# Patient Record
Sex: FEMALE | Race: WHITE | NOT HISPANIC OR LATINO | Employment: PART TIME | URBAN - METROPOLITAN AREA
[De-identification: names, ages, dates, MRNs, and addresses within clinical notes are randomized per-mention and may not be internally consistent; named-entity substitution may affect disease eponyms.]

---
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
## 2024-02-14 PROCEDURE — 87081 CULTURE SCREEN ONLY: CPT

## 2025-03-17 ENCOUNTER — LAB (OUTPATIENT)
Dept: LAB | Facility: HOSPITAL | Age: 20
End: 2025-03-17
Payer: COMMERCIAL

## 2025-03-17 ENCOUNTER — TELEPHONE (OUTPATIENT)
Dept: FAMILY MEDICINE CLINIC | Facility: CLINIC | Age: 20
End: 2025-03-17

## 2025-03-17 ENCOUNTER — OFFICE VISIT (OUTPATIENT)
Dept: FAMILY MEDICINE CLINIC | Facility: CLINIC | Age: 20
End: 2025-03-17
Payer: COMMERCIAL

## 2025-03-17 VITALS
OXYGEN SATURATION: 99 % | BODY MASS INDEX: 25.4 KG/M2 | RESPIRATION RATE: 20 BRPM | WEIGHT: 138 LBS | DIASTOLIC BLOOD PRESSURE: 72 MMHG | HEIGHT: 62 IN | HEART RATE: 81 BPM | SYSTOLIC BLOOD PRESSURE: 98 MMHG | TEMPERATURE: 98.7 F

## 2025-03-17 DIAGNOSIS — R07.9 CHEST PAIN, UNSPECIFIED TYPE: Primary | ICD-10-CM

## 2025-03-17 DIAGNOSIS — Z00.00 WELLNESS EXAMINATION: ICD-10-CM

## 2025-03-17 DIAGNOSIS — R07.9 CHEST PAIN, UNSPECIFIED TYPE: ICD-10-CM

## 2025-03-17 DIAGNOSIS — N64.4 FOCAL NON-CYCLICAL BREAST PAIN: ICD-10-CM

## 2025-03-17 LAB
25(OH)D3 SERPL-MCNC: 10 NG/ML (ref 30–100)
ALBUMIN SERPL-MCNC: 4.3 G/DL (ref 3.5–5.2)
ALBUMIN/GLOB SERPL: 1.5 G/DL
ALP SERPL-CCNC: 73 U/L (ref 39–117)
ALT SERPL W P-5'-P-CCNC: 23 U/L (ref 1–33)
ANION GAP SERPL CALCULATED.3IONS-SCNC: 9.9 MMOL/L (ref 5–15)
AST SERPL-CCNC: 22 U/L (ref 1–32)
BILIRUB SERPL-MCNC: 0.4 MG/DL (ref 0–1.2)
BUN SERPL-MCNC: 13 MG/DL (ref 6–20)
BUN/CREAT SERPL: 20.3 (ref 7–25)
CALCIUM SPEC-SCNC: 9 MG/DL (ref 8.6–10.5)
CHLORIDE SERPL-SCNC: 104 MMOL/L (ref 98–107)
CHOLEST SERPL-MCNC: 102 MG/DL (ref 0–200)
CO2 SERPL-SCNC: 24.1 MMOL/L (ref 22–29)
CREAT SERPL-MCNC: 0.64 MG/DL (ref 0.57–1)
D DIMER PPP FEU-MCNC: <0.27 MCGFEU/ML (ref 0–0.5)
DEPRECATED RDW RBC AUTO: 37.8 FL (ref 37–54)
EGFRCR SERPLBLD CKD-EPI 2021: 129.1 ML/MIN/1.73
ERYTHROCYTE [DISTWIDTH] IN BLOOD BY AUTOMATED COUNT: 11.6 % (ref 12.3–15.4)
GLOBULIN UR ELPH-MCNC: 2.8 GM/DL
GLUCOSE SERPL-MCNC: 81 MG/DL (ref 65–99)
HBA1C MFR BLD: 5 % (ref 4.8–5.6)
HCG SERPL QL: NEGATIVE
HCT VFR BLD AUTO: 41.8 % (ref 34–46.6)
HDLC SERPL-MCNC: 35 MG/DL (ref 40–60)
HGB BLD-MCNC: 13.6 G/DL (ref 12–15.9)
LDLC SERPL CALC-MCNC: 57 MG/DL (ref 0–100)
LDLC/HDLC SERPL: 1.7 {RATIO}
MCH RBC QN AUTO: 29.4 PG (ref 26.6–33)
MCHC RBC AUTO-ENTMCNC: 32.5 G/DL (ref 31.5–35.7)
MCV RBC AUTO: 90.3 FL (ref 79–97)
PLATELET # BLD AUTO: 234 10*3/MM3 (ref 140–450)
PMV BLD AUTO: 11.8 FL (ref 6–12)
POTASSIUM SERPL-SCNC: 3.9 MMOL/L (ref 3.5–5.2)
PROT SERPL-MCNC: 7.1 G/DL (ref 6–8.5)
RBC # BLD AUTO: 4.63 10*6/MM3 (ref 3.77–5.28)
SODIUM SERPL-SCNC: 138 MMOL/L (ref 136–145)
TRIGL SERPL-MCNC: 38 MG/DL (ref 0–150)
TROPONIN T SERPL HS-MCNC: <6 NG/L
TSH SERPL DL<=0.05 MIU/L-ACNC: 1.72 UIU/ML (ref 0.27–4.2)
VIT B12 BLD-MCNC: 218 PG/ML (ref 211–946)
VLDLC SERPL-MCNC: 10 MG/DL (ref 5–40)
WBC NRBC COR # BLD AUTO: 5.98 10*3/MM3 (ref 3.4–10.8)

## 2025-03-17 PROCEDURE — 36415 COLL VENOUS BLD VENIPUNCTURE: CPT

## 2025-03-17 PROCEDURE — 82607 VITAMIN B-12: CPT

## 2025-03-17 PROCEDURE — 85379 FIBRIN DEGRADATION QUANT: CPT

## 2025-03-17 PROCEDURE — 84703 CHORIONIC GONADOTROPIN ASSAY: CPT

## 2025-03-17 PROCEDURE — 84484 ASSAY OF TROPONIN QUANT: CPT

## 2025-03-17 PROCEDURE — 82306 VITAMIN D 25 HYDROXY: CPT

## 2025-03-17 PROCEDURE — 80061 LIPID PANEL: CPT

## 2025-03-17 PROCEDURE — 83036 HEMOGLOBIN GLYCOSYLATED A1C: CPT

## 2025-03-17 PROCEDURE — 80050 GENERAL HEALTH PANEL: CPT

## 2025-03-17 RX ORDER — METHYLPREDNISOLONE 4 MG/1
TABLET ORAL
Qty: 21 TABLET | Refills: 0 | Status: SHIPPED | OUTPATIENT
Start: 2025-03-17

## 2025-03-17 NOTE — PROGRESS NOTES
"     New Patient Office Visit      Patient Name: Julius Garcia  : 2005   MRN: [id number]     Chief Complaint:  Establish Care (Pt has been having pain behind left breast for about a month.)     History of Present Illness:     Breast pain  The patient says mostly pain is on the left sometimes on the right. This has been going on for about a month.   The pain is not constant. It is on and off and happens once a day every other day . It lasts about a few minutes. It is sharp then goes to dull. It does not go to the back it does not go to her arm. It is worse with a deep breath.   No palpitations or dizziness. It is worse with movement or certain breaths. No reflux or vomiting. No cough.     Subjective        Past Medical History:   Diagnosis Date    ADHD (attention deficit hyperactivity disorder)        Past Surgical History:   Procedure Laterality Date    TONSILLECTOMY  2017    WISDOM TOOTH EXTRACTION         Family History   Problem Relation Age of Onset    Drug abuse Mother     Heart disease Maternal Grandmother     Hyperlipidemia Maternal Grandmother        Social History     Socioeconomic History    Marital status: Single   Tobacco Use    Smoking status: Never    Smokeless tobacco: Never   Vaping Use    Vaping status: Never Used   Substance and Sexual Activity    Alcohol use: Not Currently    Drug use: Never    Sexual activity: Yes     Partners: Male     Birth control/protection: I.U.D.          Current Outpatient Medications:     methylPREDNISolone (MEDROL) 4 MG dose pack, Take as directed on package instructions., Disp: 21 tablet, Rfl: 0    No Known Allergies    Objective     Physical Exam:  Vitals:    25 1010   BP: 98/72   Pulse: 81   Resp: 20   Temp: 98.7 °F (37.1 °C)   TempSrc: Temporal   SpO2: 99%   Weight: 62.6 kg (138 lb)   Height: 157.9 cm (62.17\")      Body mass index is 25.11 kg/m².     Physical Exam  Constitutional:       General: She is not in acute distress.     Appearance: Normal " appearance.   HENT:      Head: Normocephalic and atraumatic.   Eyes:      Extraocular Movements: Extraocular movements intact.   Cardiovascular:      Rate and Rhythm: Normal rate and regular rhythm.      Heart sounds: No murmur heard.  Pulmonary:      Effort: Pulmonary effort is normal. No respiratory distress.      Breath sounds: Normal breath sounds. No stridor. No wheezing, rhonchi or rales.   Skin:     Findings: No rash.   Neurological:      General: No focal deficit present.      Mental Status: She is alert.   Psychiatric:         Mood and Affect: Mood normal.           ECG 12 Lead    Date/Time: 3/17/2025 10:31 AM  Performed by: Melba Shelby DO    Authorized by: Melba Shelby DO  Comparison: not compared with previous ECG   Rhythm: sinus rhythm  Rate: normal    Clinical impression: non-specific ECG  Comments: Right ventricular conduction delay        Assessment / Plan      Assessment/Plan:   Diagnoses and all orders for this visit:    1. Chest pain, unspecified type (Primary)  -     XR Chest PA & Lateral (In Office)  -     US breast bilateral complete; Future  -     ECG 12 Lead  -     D-dimer, Quantitative; Future  -     High Sensitivity Troponin T; Future  -     hCG, Serum, Qualitative; Future    2. Wellness examination  -     CBC (No Diff); Future  -     Comprehensive Metabolic Panel; Future  -     Hemoglobin A1c; Future  -     Lipid Panel; Future  -     TSH Rfx On Abnormal To Free T4; Future  -     Vitamin B12; Future  -     Vitamin D,25-Hydroxy; Future    Other orders  -     methylPREDNISolone (MEDROL) 4 MG dose pack; Take as directed on package instructions.  Dispense: 21 tablet; Refill: 0       Breasts examined with sean sanchezek in the room. No lumps that are concerning. She reports pain deep to the lower left breast but it is not painful on palpation of the underlying ribs.   Concern for pleurisy or msk pain. Trial of prednisone. I let the patient know that this medication can cause increased  anxiety, depression, and insomnia. Patient will seek care for any concerns.       Labs checked above. EKG no acute changes. Repeat EKG at 3 week follow up.     Check us breasts and xray to further eval for any underlying causes.       Return in about 3 weeks (around 4/7/2025) for Annual physical.       Melba Shelby D.O.  [facility] [place]

## 2025-03-17 NOTE — TELEPHONE ENCOUNTER
Caller: Julius Garcia    Relationship: Self    Best call back number: [phone]    What is the best time to reach you: ANY    Who are you requesting to speak with (clinical staff, provider,  specific staff member):     CLINICAL    What was the call regarding:     PATIENT IS WAITING FOR A CALL REGARDING APPOINTMENT FOR CHEST X-RAY

## 2025-03-18 ENCOUNTER — TELEPHONE (OUTPATIENT)
Dept: FAMILY MEDICINE CLINIC | Facility: CLINIC | Age: 20
End: 2025-03-18
Payer: COMMERCIAL

## 2025-03-18 RX ORDER — ERGOCALCIFEROL 1.25 MG/1
50000 CAPSULE, LIQUID FILLED ORAL WEEKLY
Qty: 8 CAPSULE | Refills: 0 | Status: SHIPPED | OUTPATIENT
Start: 2025-03-18

## 2025-03-18 NOTE — TELEPHONE ENCOUNTER
HUB TO RELAY    LVM      Per Dr. Shelby-       Please call the patient to let her know:  Vitamin D is low. Make sure to take 800-1000 units daily over the counter.   I also called in a vitamin d boost to take once weekly for 8 weeks.   Increase exercise to raise HDL, the good cholesterol.  Other labs stable.

## 2025-04-07 ENCOUNTER — TELEPHONE (OUTPATIENT)
Dept: FAMILY MEDICINE CLINIC | Facility: CLINIC | Age: 20
End: 2025-04-07

## 2025-07-28 ENCOUNTER — HOSPITAL ENCOUNTER (EMERGENCY)
Facility: HOSPITAL | Age: 20
Discharge: HOME | End: 2025-07-28
Attending: EMERGENCY MEDICINE | Admitting: EMERGENCY MEDICINE
Payer: COMMERCIAL

## 2025-07-28 ENCOUNTER — APPOINTMENT (OUTPATIENT)
Dept: GENERAL RADIOLOGY | Facility: HOSPITAL | Age: 20
End: 2025-07-28
Payer: COMMERCIAL

## 2025-07-28 VITALS
SYSTOLIC BLOOD PRESSURE: 112 MMHG | DIASTOLIC BLOOD PRESSURE: 71 MMHG | HEIGHT: 61 IN | WEIGHT: 135 LBS | HEART RATE: 95 BPM | BODY MASS INDEX: 25.49 KG/M2 | TEMPERATURE: 98.7 F | RESPIRATION RATE: 18 BRPM | OXYGEN SATURATION: 95 %

## 2025-07-28 DIAGNOSIS — S52.135A CLOSED NONDISPLACED FRACTURE OF NECK OF LEFT RADIUS, INITIAL ENCOUNTER: Primary | ICD-10-CM

## 2025-07-28 PROCEDURE — 73080 X-RAY EXAM OF ELBOW: CPT

## 2025-07-28 PROCEDURE — 99283 EMERGENCY DEPT VISIT LOW MDM: CPT

## 2025-07-28 NOTE — ED PROVIDER NOTES
Subjective   History of Present Illness    Pt presents after a fall.  She tripped and fell over an object on the floor, fell onto outstretched hand.  She is having pain in the left elbow and in the forearm spreading down from the elbow.  She denies pain to the R arm.  She is having intermittent tingling in the entire hand, not restricted to a dermatome or nerve distribution, which resolves with movement.  No pain in the upper arm or shoulder.  Denies injury to head, neck or legs.    History provided by:  Patient      Review of Systems    Past Medical History:   Diagnosis Date    ADHD (attention deficit hyperactivity disorder)        No Known Allergies    Past Surgical History:   Procedure Laterality Date    TONSILLECTOMY  2017    WISDOM TOOTH EXTRACTION         Family History   Problem Relation Age of Onset    Drug abuse Mother     Heart disease Maternal Grandmother     Hyperlipidemia Maternal Grandmother        Social History     Socioeconomic History    Marital status: Single   Tobacco Use    Smoking status: Never    Smokeless tobacco: Never   Vaping Use    Vaping status: Never Used   Substance and Sexual Activity    Alcohol use: Not Currently    Drug use: Never    Sexual activity: Yes     Partners: Male     Birth control/protection: I.U.D.           Objective   Physical Exam  Vitals and nursing note reviewed.   Constitutional:       General: She is not in acute distress.     Appearance: Normal appearance. She is not ill-appearing.   HENT:      Head: Normocephalic and atraumatic.   Eyes:      General: No scleral icterus.        Right eye: No discharge.         Left eye: No discharge.      Conjunctiva/sclera: Conjunctivae normal.   Cardiovascular:      Rate and Rhythm: Normal rate.   Pulmonary:      Effort: Pulmonary effort is normal. No respiratory distress.   Musculoskeletal:         General: No swelling or deformity.      Cervical back: Normal range of motion and neck supple.      Comments: Left radial head  tenderness, pain with supination.  Forearm nontender.  Shoulder and upper arm nontender.    Wrist nontender, extension intact.  Otherwise examination of the injured hand reveals a strong radial pulse and normal cap refill.  Sensation and gross motor are intact in the median, ulnar and radial nerve distributions.  Thumb opposition is intact.  There are no nailbed injuries.     Skin:     General: Skin is dry.      Findings: No rash.   Neurological:      General: No focal deficit present.      Mental Status: She is alert and oriented to person, place, and time. Mental status is at baseline.   Psychiatric:         Mood and Affect: Mood normal.         Behavior: Behavior normal.         Thought Content: Thought content normal.         Procedures           ED Course    XR elbow read by me radial head fracture. Report reviewed.     Sling applied.    Patient stable on serial rechecks.  Discussed findings, concerns, plan of care, expected course, reasons to return and followup.  Provided the opportunity to ask questions.    She declined prescription pain meds.                                           IVY reviewed by Ariel Ramos MD       Medical Decision Making  Problems Addressed:  Closed nondisplaced fracture of neck of left radius, initial encounter: complicated acute illness or injury    Amount and/or Complexity of Data Reviewed  Radiology: ordered and independent interpretation performed. Decision-making details documented in ED Course.    Risk  Prescription drug management.        Final diagnoses:   Closed nondisplaced fracture of neck of left radius, initial encounter       ED Disposition  ED Disposition       ED Disposition   Discharge    Condition   Stable    Comment   --               Flex Jordan MD  [address] [id number] [phone]    In 1 week           Medication List      No changes were made to your prescriptions during this visit.            Ariel Ramos  MD [place]  07/28/25 [id number]